# Patient Record
Sex: MALE | Race: WHITE | NOT HISPANIC OR LATINO | Employment: FULL TIME | URBAN - METROPOLITAN AREA
[De-identification: names, ages, dates, MRNs, and addresses within clinical notes are randomized per-mention and may not be internally consistent; named-entity substitution may affect disease eponyms.]

---

## 2020-11-04 ENCOUNTER — NON-PROVIDER VISIT (OUTPATIENT)
Dept: URGENT CARE | Facility: PHYSICIAN GROUP | Age: 30
End: 2020-11-04

## 2020-11-04 DIAGNOSIS — Z02.1 PRE-EMPLOYMENT DRUG SCREENING: Primary | ICD-10-CM

## 2020-11-04 LAB
AMP AMPHETAMINE: NORMAL
BREATH ALCOHOL COMMENT: NORMAL
COC COCAINE: NORMAL
INT CON NEG: NEGATIVE
INT CON POS: POSITIVE
MET METHAMPHETAMINES: NORMAL
OPI OPIATES: NORMAL
PCP PHENCYCLIDINE: NORMAL
POC BREATHALIZER: 0 PERCENT (ref 0–0.01)
POC DRUG COMMENT 753798-OCCUPATIONAL HEALTH: NORMAL
THC: NORMAL

## 2020-11-04 PROCEDURE — 80305 DRUG TEST PRSMV DIR OPT OBS: CPT | Performed by: PHYSICIAN ASSISTANT

## 2020-11-04 PROCEDURE — 82075 ASSAY OF BREATH ETHANOL: CPT | Performed by: PHYSICIAN ASSISTANT

## 2021-03-18 ENCOUNTER — OFFICE VISIT (OUTPATIENT)
Dept: URGENT CARE | Facility: PHYSICIAN GROUP | Age: 31
End: 2021-03-18
Payer: COMMERCIAL

## 2021-03-18 VITALS
WEIGHT: 165 LBS | BODY MASS INDEX: 23.1 KG/M2 | HEIGHT: 71 IN | TEMPERATURE: 97.8 F | HEART RATE: 90 BPM | SYSTOLIC BLOOD PRESSURE: 146 MMHG | DIASTOLIC BLOOD PRESSURE: 98 MMHG | OXYGEN SATURATION: 99 %

## 2021-03-18 DIAGNOSIS — R22.1 NECK MASS: ICD-10-CM

## 2021-03-18 PROCEDURE — 99204 OFFICE O/P NEW MOD 45 MIN: CPT | Performed by: PHYSICIAN ASSISTANT

## 2021-03-18 ASSESSMENT — ENCOUNTER SYMPTOMS
DIARRHEA: 0
MYALGIAS: 0
VOMITING: 0
SHORTNESS OF BREATH: 0
EYE PAIN: 0
NAUSEA: 0
ABDOMINAL PAIN: 0
CHILLS: 0
HEADACHES: 0
CONSTIPATION: 0
SORE THROAT: 0
FEVER: 0
COUGH: 0

## 2021-03-19 ENCOUNTER — HOSPITAL ENCOUNTER (OUTPATIENT)
Dept: RADIOLOGY | Facility: MEDICAL CENTER | Age: 31
End: 2021-03-19
Attending: PHYSICIAN ASSISTANT
Payer: COMMERCIAL

## 2021-03-19 DIAGNOSIS — R22.1 NECK MASS: ICD-10-CM

## 2021-03-19 PROCEDURE — 76536 US EXAM OF HEAD AND NECK: CPT

## 2021-03-19 NOTE — PROGRESS NOTES
Subjective:   Debra Wray is a 30 y.o. male who presents for Neck Problem (right side poss swollen lymph node)      HPI:  Is a 30-year-old male presents to urgent care complaining of right-sided neck discomfort and a palpable lymph node or mass embedded in the sternocleidomastoid of the right neck.  He first noticed it around 3 months ago but it seems to be slightly more bothersome over the last week.  He denies any ear pain or sore throat.  He has not noticed any skin lesions or anything around it.  Nothing seems to help or make it worse.  He is concerned as he has a history of oral tobacco use of the possibility this represents malignancy.    Review of Systems   Constitutional: Negative for chills and fever.   HENT: Negative for congestion, ear pain and sore throat.    Eyes: Negative for pain.   Respiratory: Negative for cough and shortness of breath.    Cardiovascular: Negative for chest pain.   Gastrointestinal: Negative for abdominal pain, constipation, diarrhea, nausea and vomiting.   Genitourinary: Negative for dysuria.   Musculoskeletal: Negative for myalgias.   Skin: Negative for rash.   Neurological: Negative for headaches.       Medications:    • This patient does not have an active medication from one of the medication groupers.    Allergies: Patient has no known allergies.    Problem List: Debra Wray does not have a problem list on file.    Surgical History:  No past surgical history on file.    Past Social Hx: Debra Wray  reports that he has quit smoking. He has never used smokeless tobacco. He reports current alcohol use. He reports current drug use. Drug: Marijuana.     Past Family Hx:  Debra Wray family history is not on file.     Problem list, medications, and allergies reviewed by myself today in Epic.     Objective:     /98 (BP Location: Left arm, Patient Position: Sitting, BP Cuff Size: Adult)   Pulse 90   Temp 36.6 °C (97.8  "°F) (Temporal)   Ht 1.803 m (5' 11\")   Wt 74.8 kg (165 lb)   SpO2 99%   BMI 23.01 kg/m²     Physical Exam  Vitals reviewed.   Constitutional:       Appearance: Normal appearance.   HENT:      Head: Normocephalic and atraumatic.      Right Ear: External ear normal.      Left Ear: External ear normal.      Nose: Nose normal.      Mouth/Throat:      Mouth: Mucous membranes are moist.   Eyes:      Conjunctiva/sclera: Conjunctivae normal.   Neck:      Comments: Right-sided anterior cervical chain as well as embedded within the right sternocleidomastoid there is a small nonmobile 1 cm x 1 cm nodule which not particularly soft.  Suspicious for lymph node versus subcutaneous mass.  Mild tenderness in the area.  Other trace anterior cervical and posterior cervical lymphadenopathy on the right side as well.  No supraclavicular lymphadenopathy  Cardiovascular:      Rate and Rhythm: Normal rate.   Pulmonary:      Effort: Pulmonary effort is normal.   Skin:     General: Skin is warm and dry.      Capillary Refill: Capillary refill takes less than 2 seconds.   Neurological:      Mental Status: He is alert and oriented to person, place, and time.         Assessment/Plan:     Diagnosis and associated orders:     1. Neck mass  US-SOFT TISSUES OF HEAD - NECK      Comments/MDM:     • 2 to 3 months of swollen lymph node is concerning for a variety of different conditions including but not limited to malignancy.  This could represent some reactive lymphadenopathy to an ongoing insult however I think is reasonable perform an ultrasound.  I discussed with the patient that he can arrange for an outpatient ultrasound and he will receive the results when I work next and this may result in some delay in finding out these results.  I told him to go to the ER if he notes his symptoms are getting worse or he notices any problems with his airway or throat closure or anything that he deems emergent.  I encouraged him to pursue finding a " primary care provider who can follow-up with routine health maintenance any further diagnoses.         Differential diagnosis, natural history, supportive care, and indications for immediate follow-up discussed.    Advised the patient to follow-up with the primary care physician for recheck, reevaluation, and consideration of further management.    Please note that this dictation was created using voice recognition software. I have made a reasonable attempt to correct obvious errors, but I expect that there are errors of grammar and possibly content that I did not discover before finalizing the note.    This note was electronically signed by Augie Aquino PA-C

## 2021-03-20 ENCOUNTER — TELEPHONE (OUTPATIENT)
Dept: URGENT CARE | Facility: PHYSICIAN GROUP | Age: 31
End: 2021-03-20

## 2021-03-20 DIAGNOSIS — R22.1 NECK MASS: ICD-10-CM

## 2021-03-20 NOTE — TELEPHONE ENCOUNTER
Left voicemail for patient with his previous permission.  Reviewed the ultrasound results.  Will send ref to primary. Encouraged call back with questions.  Encouraged signup for MyChart to see results.    Augie Aquino P.A.-C.